# Patient Record
Sex: FEMALE | Race: WHITE
[De-identification: names, ages, dates, MRNs, and addresses within clinical notes are randomized per-mention and may not be internally consistent; named-entity substitution may affect disease eponyms.]

---

## 2019-07-26 ENCOUNTER — HOSPITAL ENCOUNTER (EMERGENCY)
Dept: HOSPITAL 25 - ED | Age: 79
Discharge: HOME | End: 2019-07-26
Payer: MEDICARE

## 2019-07-26 VITALS — DIASTOLIC BLOOD PRESSURE: 80 MMHG | SYSTOLIC BLOOD PRESSURE: 164 MMHG

## 2019-07-26 DIAGNOSIS — M51.37: ICD-10-CM
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
## 2019-07-26 DIAGNOSIS — K21.9: ICD-10-CM

## 2019-07-26 DIAGNOSIS — Z88.6: ICD-10-CM

## 2019-07-26 DIAGNOSIS — R03.0: ICD-10-CM

## 2019-07-26 DIAGNOSIS — M54.5: ICD-10-CM

## 2019-07-26 DIAGNOSIS — Z96.653: ICD-10-CM

## 2019-07-26 DIAGNOSIS — S32.010A: Primary | ICD-10-CM

## 2019-07-26 LAB
ALBUMIN SERPL BCG-MCNC: 3.9 G/DL (ref 3.2–5.2)
ALBUMIN/GLOB SERPL: 1.3 {RATIO} (ref 1–3)
ALP SERPL-CCNC: 55 U/L (ref 34–104)
ALT SERPL W P-5'-P-CCNC: 17 U/L (ref 7–52)
ANION GAP SERPL CALC-SCNC: 8 MMOL/L (ref 2–11)
AST SERPL-CCNC: 20 U/L (ref 13–39)
BASOPHILS # BLD AUTO: 0.1 10^3/UL (ref 0–0.2)
BUN SERPL-MCNC: 19 MG/DL (ref 6–24)
BUN/CREAT SERPL: 20 (ref 8–20)
CALCIUM SERPL-MCNC: 9.4 MG/DL (ref 8.6–10.3)
CHLORIDE SERPL-SCNC: 91 MMOL/L (ref 101–111)
EOSINOPHIL # BLD AUTO: 0 10^3/UL (ref 0–0.6)
GLOBULIN SER CALC-MCNC: 2.9 G/DL (ref 2–4)
GLUCOSE SERPL-MCNC: 121 MG/DL (ref 70–100)
HCO3 SERPL-SCNC: 31 MMOL/L (ref 22–32)
HCT VFR BLD AUTO: 38 % (ref 35–47)
HGB BLD-MCNC: 13 G/DL (ref 12–16)
LYMPHOCYTES # BLD AUTO: 0.8 10^3/UL (ref 1–4.8)
MCH RBC QN AUTO: 30 PG (ref 27–31)
MCHC RBC AUTO-ENTMCNC: 34 G/DL (ref 31–36)
MCV RBC AUTO: 90 FL (ref 80–97)
MONOCYTES # BLD AUTO: 0.7 10^3/UL (ref 0–0.8)
NEUTROPHILS # BLD AUTO: 7.5 10^3/UL (ref 1.5–7.7)
NRBC # BLD AUTO: 0 10^3/UL
NRBC BLD QL AUTO: 0
PLATELET # BLD AUTO: 236 10^3/UL (ref 150–450)
POTASSIUM SERPL-SCNC: 4.3 MMOL/L (ref 3.5–5)
PROT SERPL-MCNC: 6.8 G/DL (ref 6.4–8.9)
RBC # BLD AUTO: 4.27 10^6 /UL (ref 3.7–4.87)
SODIUM SERPL-SCNC: 130 MMOL/L (ref 135–145)
WBC # BLD AUTO: 9.1 10^3/UL (ref 3.5–10.8)

## 2019-07-26 PROCEDURE — 99283 EMERGENCY DEPT VISIT LOW MDM: CPT

## 2019-07-26 PROCEDURE — 80053 COMPREHEN METABOLIC PANEL: CPT

## 2019-07-26 PROCEDURE — 96361 HYDRATE IV INFUSION ADD-ON: CPT

## 2019-07-26 PROCEDURE — 96374 THER/PROPH/DIAG INJ IV PUSH: CPT

## 2019-07-26 PROCEDURE — 72131 CT LUMBAR SPINE W/O DYE: CPT

## 2019-07-26 PROCEDURE — 36415 COLL VENOUS BLD VENIPUNCTURE: CPT

## 2019-07-26 PROCEDURE — 85025 COMPLETE CBC W/AUTO DIFF WBC: CPT

## 2019-07-26 NOTE — ED
Back Pain





- HPI Summary


HPI Summary: 





80 yo female presents to [facility] ED via EMS for low back pain. Pt tells me that for 

the last few years she has had some mild low back pain, but nothing 

significant. She has a home health aide that also takes her places as pt does 

not drive. On 7/17 she was trying to get into the truck of her aide and in 

stepping up into the truck, felt a pull in her lower back. Has had severe pain 

since that time. She saw her PCP on 7/22 for this and was given a muscle 

relaxer with no relief of discomfort. She saw her PCP again on 7/24 and tells 

me that X-Rays were done, but pt has not gotten results of these. She tried 

calling her PCP today for results and they advised her to come to the ED as she 

was still in severe pain. 





She lives alone in a senior living apartment. She ambulates with a walker at 

baseline and is still able to do this. Pain is worse with going from a lying/

sitting to a standing position. She has no radiation of pain. No numbness or 

tingling. Denies fall, saddle anesthesia, loss of bowel/bladder control, 

dysuria. 





- History of Current Complaint


Chief Complaint: EDBackInjuryPain


Stated Complaint: LOWER BACK PAIN X1 WEEK PER EMS


Time Seen by Provider: 07/26/19 17:24


Hx Obtained From: Patient


Onset/Duration: Sudden Onset


Severity Initially: Severe


Severity Currently: Severe


Pain Intensity: 9


Pain Scale Used: 0-10 Numeric





- Allergies/Home Medications


Allergies/Adverse Reactions: 


 Allergies











Allergy/AdvReac Type Severity Reaction Status Date / Time


 


aspirin Allergy  Bleeding Verified 07/26/19 17:20











Home Medications: 


 Home Medications





Tizanidine HCl 1 tab PO TID 07/26/19 [History Confirmed 07/26/19]


predniSONE TAB* [Deltasone 20 MG TAB*] 40 mg PO DAILY 07/26/19 [History 

Confirmed 07/26/19]











PMH/Surg Hx/FS Hx/Imm Hx


Endocrine/Hematology History: 


   Denies: Hx Anticoagulant Therapy, Hx Blood Disorders, Hx Anemia


Cardiovascular History: 


   Denies: Hx Angina, Hx Cardiac Arrest, Hx Congestive Heart Failure, Hx 

Hypertension, Other Cardiovascular Problems/Disorders


Respiratory History: 


   Denies: Hx Chronic Obstructive Pulmonary Disease (COPD), Other Respiratory 

Problems/Disorders


GI History: Reports: Hx Gastroesophageal Reflux Disease - ON MEDS


   Denies: Other GI Disorders


 History: 


   Denies: Hx Chronic Renal Failure, Hx Dialysis


Musculoskeletal History: Reports: Hx Arthritis - BACK, MADELINE KNEES REPLACED


Sensory History: Reports: Hx Cataracts - MADELINE, Hx Contacts or Glasses - GLASSES


   Denies: Hx Hearing Aid


Opthamlomology History: Reports: Hx Cataracts - MADELINE, Hx Contacts or Glasses - 

GLASSES


Neurological History: Reports: Hx Migraine - MANY YEARS AGO


   Denies: Other Neuro Impairments/Disorders


Psychiatric History: 


   Denies: Hx Anxiety, Hx Depression





- Surgical History


Surgical History: Yes


Surgery Procedure, Year, and Place: MADELINE VEIN STRIPPING, [facility], 1970S.  MADELINE KNEE 

REPACEMENTS, 2002, [facility].  GALLBLADDER,  28 YRS AGO, [place].  HERNIA 

1970S, [facility].  MADELINE BREASTS BX.  APPENDECTOMY.  FACIAL SKIN CANCER REMOVED


Hx Anesthesia Reactions: Yes - N/V


Infectious Disease History: No


Infectious Disease History: 


   Denies: Traveled Outside the [place] in Last 30 Days





- Family History


Known Family History: Positive: Unknown





- Social History


Occupation: Retired


Lives: Alone


Alcohol Use: Rare


Alcohol Amount: 2 BEERS A WEEK


Substance Use Type: Reports: None


Amount Used/How Often: 1/2 PACK A DAY


Length of Time of Smoking/Using Tobacco: 35 YEARS


Have You Smoked in the Last Year: Yes





Review of Systems


Constitutional: Negative


Eyes: Negative


ENT: Negative


Cardiovascular: Negative


Respiratory: Negative


Gastrointestinal: Negative


Genitourinary: Negative


Musculoskeletal: Other - Low back pain


Skin: Negative


Neurological: Negative


Psychological: Normal


All Other Systems Reviewed And Are Negative: Yes





Physical Exam





- Summary


Physical Exam Summary: 





 


GENERAL: NAD. WDWN. No pain distress.


SKIN: No rashes, sores, lesions, or open wounds.


NECK: Supple. FROM. Nontender. No lymphadenopathy. 


CHEST:  CTAB. No r/r/w. No accessory muscle use. Breathing comfortably and in 

no distress.


CV:  RRR. Without m/r/g. Pulses intact. Cap refill <2seconds


ABDOMEN:  Soft. NTTP. No distention or guarding. No CVA tenderness. Bowel 

sounds present


MSK: TTP over lumbar paraspinal muscles. No midline vertebral tenderness. Pain 

with flexion and extension of spine. Negative SLR. Strength 5/5 B/L LEs 

including dorsiflexion and plantar flexion. FROM B/L LEs. No edema. Pain in 

lower back with knee flexion.


NEURO: Alert. Sensations intact B/L LEs L3-S1. Reflexes intact. Rectal tone 

intact. 


PSYCH: Age appropriate behavior.


Triage Information Reviewed: Yes


Vital Signs On Initial Exam: 


 Initial Vitals











Temp Pulse Resp BP Pulse Ox


 


 96.9 F   73   18   145/89   92 


 


 07/26/19 17:18  07/26/19 17:18  07/26/19 17:18  07/26/19 17:18  07/26/19 17:18











Vital Signs Reviewed: Yes





Diagnostics





- Vital Signs


 


 Vital Signs (72 hours)











  07/26/19 07/26/19 07/26/19





  17:18 17:27 17:28


 


Temperature 96.9 F  


 


Pulse Rate 73 69 70


 


Respiratory 18  





Rate   


 


Blood Pressure 145/89  149/98





(mmHg)   


 


O2 Sat by Pulse 92 94 95





Oximetry   














  07/26/19 07/26/19 07/26/19





  17:58 18:00 18:28


 


Temperature   


 


Pulse Rate 74 73 73


 


Respiratory   





Rate   


 


Blood Pressure 151/88  157/100





(mmHg)   


 


O2 Sat by Pulse 96 94 95





Oximetry   














  07/26/19 07/26/19 07/26/19





  18:58 19:00 19:29


 


Temperature   


 


Pulse Rate 72 70 71


 


Respiratory   





Rate   


 


Blood Pressure 155/97  181/106





(mmHg)   


 


O2 Sat by Pulse 94 95 94





Oximetry   














  07/26/19 07/26/19 07/26/19





  19:58 20:00 20:01


 


Temperature   


 


Pulse Rate 73 71 74


 


Respiratory   





Rate   


 


Blood Pressure 181/117  180/93





(mmHg)   


 


O2 Sat by Pulse 91 93 95





Oximetry   














  07/26/19 07/26/19 07/26/19





  20:43 20:58 21:00


 


Temperature   


 


Pulse Rate 75 79 79


 


Respiratory   





Rate   


 


Blood Pressure 179/95 164/80 





(mmHg)   


 


O2 Sat by Pulse 93 93 94





Oximetry   














  07/26/19





  21:25


 


Temperature 97.7 F


 


Pulse Rate 79


 


Respiratory 18





Rate 


 


Blood Pressure 164/80





(mmHg) 


 


O2 Sat by Pulse 95





Oximetry 














- Laboratory


Lab Results: 





 Laboratory Tests











  07/26/19 07/26/19





  18:32 18:32


 


WBC  9.1 


 


RBC  4.27 


 


Hgb  13.0 


 


Hct  38 


 


MCV  90 


 


MCH  30 


 


MCHC  34 


 


RDW  14 


 


Plt Count  236 


 


MPV  8.3 


 


Neut % (Auto)  82.5 


 


Lymph % (Auto)  8.7 


 


Mono % (Auto)  8.1 


 


Eos % (Auto)  0.0 


 


Baso % (Auto)  0.7 


 


Absolute Neuts (auto)  7.5 


 


Absolute Lymphs (auto)  0.8 L 


 


Absolute Monos (auto)  0.7 


 


Absolute Eos (auto)  0.0 


 


Absolute Basos (auto)  0.1 


 


Absolute Nucleated RBC  0.0 


 


Nucleated RBC %  0.0 


 


Sodium   130 L


 


Potassium   4.3


 


Chloride   91 L


 


Carbon Dioxide   31


 


Anion Gap   8


 


BUN   19


 


Creatinine   0.95


 


Est GFR ( Amer)   68.3


 


Est GFR (Non-Af Amer)   56.5


 


BUN/Creatinine Ratio   20.0


 


Glucose   121 H


 


Calcium   9.4


 


Total Bilirubin   0.40


 


AST   20


 


ALT   17


 


Alkaline Phosphatase   55


 


Total Protein   6.8


 


Albumin   3.9


 


Globulin   2.9


 


Albumin/Globulin Ratio   1.3











Result Diagrams: 


 07/26/19 18:32





 07/26/19 18:32


Lab Statement: Any lab studies that have been ordered have been reviewed, and 

results considered in the medical decision making process.





Re-Evaluation





- Re-Evaluation


  ** First Eval


Re-Evaluation Time: 19:41


Change: Improved


Comment: Discussed results of CT and labs with pt and daughter Cinthya present. 

1L NS ordered given slightly low sodium. Pt states pain is improved s/p 

tramadol and is tolerating this well without lightheadedness, nausea, or 

confusion.





  ** Second Eval


Re-Evaluation Time: 20:00


Change: Unchanged


Comment: Notified of BP increasing since initial. She has no headache, dizziness

, SOB, chest pain, numbness, or vision changes. No hx of HTN. Order for 

hydralazine





Back Pain Course/Dx





- Course


Course Of Treatment: At this time she is not exhibiting any signs of cauda 

equina and suspect muscle strain vs vertebral fracture. In the ED course she 

was given tramadol for her discomfort.  CT results as above. 1L NS given for 

slight hyponatremia. She tolerated tramadol very well without adverse effects 

and good relief of pain. I discussed discharge with pt and her daughter - they 

both feel comfortable with pt being discharged to home and pt feels comfortable 

using her walker at home. Her BP was elevated during the ED course and 10mg of 

hydralazine was given - BP improved significantly to 164/80... she remained 

asmyptomatic. She was ambulated with a walker prior to discharge and reported 

feeling much better and felt stable on her feet. Will dc with rx for tramadol 

and lidoderm patch. Recommend f/u with PCP in 3-5 days for recheck and eval of 

elevated BP found today.





- Diagnoses


Provider Diagnoses: 


 L1 vertebral fracture, Low back pain, Elevated blood pressure reading








Discharge





- Sign-Out/Discharge


Documenting (check all that apply): Patient Departure


Patient Received Moderate/Deep Sedation with Procedure: No





- Discharge Plan


Condition: Stable


Disposition: HOME


Prescriptions: 


Lidocaine PATCH 5%* [Lidoderm 5% Patch*] 1 patch TRANSDERM DAILY PRN #10 patch


 PRN Reason: Pain


traMADol TAB* [Ultram*] 50 mg PO Q12H PRN #8 tab MDD 2


 PRN Reason: Pain


Patient Education Materials:  Vertebral Compression Fracture (ED), Acute Low 

Back Pain (ED), Hypertension in the Older Adult (ED)


Referrals: 


Chris Castro MD [Primary Care Provider] - 3 Days


Additional Instructions: 


If you develop a fever, shortness of breath, chest pain, new or worsening 

symptoms - please call your PCP or go to the ED immediately.


 





Your blood pressure was high at todays visit. Please see your primary provider 

within 4 weeks for recheck and re-evaluation.








Rest and ice your back to reduce pain.





Take the medications as prescribed.





Please follow up with your primary doctor in 3 days for a recheck of your pain 

and elevated blood pressure.





If you develop worsening pain, numbness, tingling, weakness, loss of bladder or 

bowel control - please call 911 or return to the ER





- Billing Disposition and Condition


Condition: STABLE


Disposition: Home